# Patient Record
Sex: FEMALE | Race: WHITE | NOT HISPANIC OR LATINO
[De-identification: names, ages, dates, MRNs, and addresses within clinical notes are randomized per-mention and may not be internally consistent; named-entity substitution may affect disease eponyms.]

---

## 2017-01-11 ENCOUNTER — APPOINTMENT (OUTPATIENT)
Dept: PLASTIC SURGERY | Facility: CLINIC | Age: 47
End: 2017-01-11

## 2017-02-07 ENCOUNTER — OUTPATIENT (OUTPATIENT)
Dept: OUTPATIENT SERVICES | Facility: HOSPITAL | Age: 47
LOS: 1 days | Discharge: HOME | End: 2017-02-07

## 2017-02-07 VITALS
HEIGHT: 67 IN | SYSTOLIC BLOOD PRESSURE: 114 MMHG | TEMPERATURE: 98 F | OXYGEN SATURATION: 99 % | WEIGHT: 147.93 LBS | RESPIRATION RATE: 20 BRPM | DIASTOLIC BLOOD PRESSURE: 67 MMHG | HEART RATE: 73 BPM

## 2017-02-07 DIAGNOSIS — N65.0 DEFORMITY OF RECONSTRUCTED BREAST: ICD-10-CM

## 2017-02-07 DIAGNOSIS — Z90.13 ACQUIRED ABSENCE OF BILATERAL BREASTS AND NIPPLES: Chronic | ICD-10-CM

## 2017-02-07 DIAGNOSIS — Z15.01 GENETIC SUSCEPTIBILITY TO MALIGNANT NEOPLASM OF BREAST: ICD-10-CM

## 2017-02-07 DIAGNOSIS — Z87.19 PERSONAL HISTORY OF OTHER DISEASES OF THE DIGESTIVE SYSTEM: Chronic | ICD-10-CM

## 2017-02-07 DIAGNOSIS — N65.1 DISPROPORTION OF RECONSTRUCTED BREAST: ICD-10-CM

## 2017-02-07 DIAGNOSIS — S43.439A SUPERIOR GLENOID LABRUM LESION OF UNSPECIFIED SHOULDER, INITIAL ENCOUNTER: Chronic | ICD-10-CM

## 2017-02-07 DIAGNOSIS — Z90.13 ACQUIRED ABSENCE OF BILATERAL BREASTS AND NIPPLES: ICD-10-CM

## 2017-02-07 LAB
APPEARANCE UR: CLEAR — SIGNIFICANT CHANGE UP
BACTERIA # UR AUTO: (no result)
BASOPHILS # BLD AUTO: 0.1 K/UL — SIGNIFICANT CHANGE UP (ref 0–0.2)
BASOPHILS NFR BLD AUTO: 1.3 % — SIGNIFICANT CHANGE UP (ref 0–2)
BILIRUB UR-MCNC: NEGATIVE — SIGNIFICANT CHANGE UP
BLD GP AB SCN SERPL QL: SIGNIFICANT CHANGE UP
COLOR SPEC: YELLOW — SIGNIFICANT CHANGE UP
DIFF PNL FLD: NEGATIVE — SIGNIFICANT CHANGE UP
EOSINOPHIL # BLD AUTO: 0.1 K/UL — SIGNIFICANT CHANGE UP (ref 0–0.5)
EOSINOPHIL NFR BLD AUTO: 2 % — SIGNIFICANT CHANGE UP (ref 0–6)
EPI CELLS # UR: (no result)
GLUCOSE UR QL: NEGATIVE MG/DL — SIGNIFICANT CHANGE UP
HCT VFR BLD CALC: 37.4 % — SIGNIFICANT CHANGE UP (ref 34.5–45)
HGB BLD-MCNC: 11.7 G/DL — SIGNIFICANT CHANGE UP (ref 11.5–15.5)
KETONES UR-MCNC: NEGATIVE — SIGNIFICANT CHANGE UP
LEUKOCYTE ESTERASE UR-ACNC: NEGATIVE — SIGNIFICANT CHANGE UP
LYMPHOCYTES # BLD AUTO: 2 K/UL — SIGNIFICANT CHANGE UP (ref 1–3.3)
LYMPHOCYTES # BLD AUTO: 26.8 % — SIGNIFICANT CHANGE UP (ref 13–44)
MCHC RBC-ENTMCNC: 26.2 PG — LOW (ref 27–34)
MCHC RBC-ENTMCNC: 31.4 GM/DL — LOW (ref 32–36)
MCV RBC AUTO: 83.5 FL — SIGNIFICANT CHANGE UP (ref 80–100)
MONOCYTES # BLD AUTO: 0.5 K/UL — SIGNIFICANT CHANGE UP (ref 0–0.9)
MONOCYTES NFR BLD AUTO: 7.1 % — SIGNIFICANT CHANGE UP (ref 2–14)
NEUTROPHILS # BLD AUTO: 4.7 K/UL — SIGNIFICANT CHANGE UP (ref 1.8–7.4)
NEUTROPHILS NFR BLD AUTO: 62.8 % — SIGNIFICANT CHANGE UP (ref 43–77)
NITRITE UR-MCNC: NEGATIVE — SIGNIFICANT CHANGE UP
PH UR: 5 — SIGNIFICANT CHANGE UP (ref 4.8–8)
PLATELET # BLD AUTO: 332 K/UL — SIGNIFICANT CHANGE UP (ref 150–400)
PROT UR-MCNC: 15 MG/DL
RBC # BLD: 4.48 M/UL — SIGNIFICANT CHANGE UP (ref 3.8–5.2)
RBC # FLD: 13.2 % — SIGNIFICANT CHANGE UP (ref 10.3–14.5)
RBC CASTS # UR COMP ASSIST: NEGATIVE /HPF — SIGNIFICANT CHANGE UP (ref 0–4)
SP GR SPEC: 1.02 — SIGNIFICANT CHANGE UP (ref 1.01–1.02)
TYPE + AB SCN PNL BLD: SIGNIFICANT CHANGE UP
UROBILINOGEN FLD QL: NEGATIVE MG/DL — SIGNIFICANT CHANGE UP
WBC # BLD: 7.5 K/UL — SIGNIFICANT CHANGE UP (ref 3.8–10.5)
WBC # FLD AUTO: 7.5 K/UL — SIGNIFICANT CHANGE UP (ref 3.8–10.5)
WBC UR QL: SIGNIFICANT CHANGE UP

## 2017-02-07 NOTE — H&P PST ADULT - HISTORY OF PRESENT ILLNESS
47 y/o female with history of breast tumor, family history of breast cancer and positive for CHEK 2 gene. S/P b/l mastectomy with reconstruction on 10/2016. Denies breast pain. Here today for PST for next stage of b/l  breast reconstruction.

## 2017-02-07 NOTE — H&P PST ADULT - NSANTHOSAYNRD_GEN_A_CORE
No. FREDDIE screening performed.  STOP BANG Legend: 0-2 = LOW Risk; 3-4 = INTERMEDIATE Risk; 5-8 = HIGH Risk

## 2017-02-07 NOTE — H&P PST ADULT - PSH
H/O bilateral mastectomy  with reconstruction, breast expanders insertion 10/2016  History of lumpectomy of both breasts  2002, 2005  History of umbilical hernia  2010  SLAP shoulder tear  right shoulder SLAP repair  03/2007

## 2017-02-07 NOTE — H&P PST ADULT - PMH
Breast tumor  2016 Oct  Family history of breast cancer    Genetic carrier status  CHEK 2  Hypoglycemia

## 2017-02-07 NOTE — H&P PST ADULT - FAMILY HISTORY
Mother  Still living? Unknown  Family history of breast cancer in mother, Age at diagnosis: Age Unknown     Aunt  Still living? Unknown  Family history of breast cancer, Age at diagnosis: Age Unknown

## 2017-02-07 NOTE — H&P PST ADULT - ASSESSMENT
45 y/o female S/P b/l mastectomy with reconstruction/b/l breast expanders. Scheduled for Revision of b/l breast reconstruction, removal of expanders, insertion of b/l silicone implants, fat grafting with Dr. Colon.    Plan  1. Stop all NSAIDS, herbal supplements and vitamins for 7 days.  2. NPO at midnight.  3. Use EZ sponges as directed  4. Use mupirocin as directed

## 2017-02-12 ENCOUNTER — RX RENEWAL (OUTPATIENT)
Age: 47
End: 2017-02-12

## 2017-02-14 ENCOUNTER — RESULT REVIEW (OUTPATIENT)
Age: 47
End: 2017-02-14

## 2017-02-14 RX ORDER — FAMOTIDINE 10 MG/ML
20 INJECTION INTRAVENOUS ONCE
Qty: 0 | Refills: 0 | Status: COMPLETED | OUTPATIENT
Start: 2017-02-15 | End: 2017-02-15

## 2017-02-14 RX ORDER — ACETAMINOPHEN 500 MG
650 TABLET ORAL ONCE
Qty: 0 | Refills: 0 | Status: COMPLETED | OUTPATIENT
Start: 2017-02-15 | End: 2017-02-15

## 2017-02-14 RX ORDER — OXYCODONE HYDROCHLORIDE 5 MG/1
10 TABLET ORAL ONCE
Qty: 0 | Refills: 0 | Status: DISCONTINUED | OUTPATIENT
Start: 2017-02-15 | End: 2017-02-15

## 2017-02-15 ENCOUNTER — OUTPATIENT (OUTPATIENT)
Dept: OUTPATIENT SERVICES | Facility: HOSPITAL | Age: 47
LOS: 1 days | Discharge: HOME | End: 2017-02-15
Payer: COMMERCIAL

## 2017-02-15 VITALS
HEIGHT: 69 IN | TEMPERATURE: 99 F | RESPIRATION RATE: 16 BRPM | WEIGHT: 145.28 LBS | OXYGEN SATURATION: 97 % | SYSTOLIC BLOOD PRESSURE: 98 MMHG | HEART RATE: 68 BPM | DIASTOLIC BLOOD PRESSURE: 52 MMHG

## 2017-02-15 VITALS
SYSTOLIC BLOOD PRESSURE: 113 MMHG | HEART RATE: 65 BPM | RESPIRATION RATE: 17 BRPM | OXYGEN SATURATION: 99 % | TEMPERATURE: 98 F | DIASTOLIC BLOOD PRESSURE: 58 MMHG

## 2017-02-15 DIAGNOSIS — S43.439A SUPERIOR GLENOID LABRUM LESION OF UNSPECIFIED SHOULDER, INITIAL ENCOUNTER: Chronic | ICD-10-CM

## 2017-02-15 DIAGNOSIS — Z87.19 PERSONAL HISTORY OF OTHER DISEASES OF THE DIGESTIVE SYSTEM: Chronic | ICD-10-CM

## 2017-02-15 DIAGNOSIS — Z90.13 ACQUIRED ABSENCE OF BILATERAL BREASTS AND NIPPLES: Chronic | ICD-10-CM

## 2017-02-15 LAB — HCG UR QL: NEGATIVE — SIGNIFICANT CHANGE UP

## 2017-02-15 PROCEDURE — 19380 REVJ RECONSTRUCTED BREAST: CPT | Mod: 59,RT

## 2017-02-15 PROCEDURE — 19342 INSJ/RPLCMT BRST IMPLT SEP D: CPT | Mod: LT

## 2017-02-15 PROCEDURE — 88300 SURGICAL PATH GROSS: CPT | Mod: 26

## 2017-02-15 PROCEDURE — 19328 RMVL INTACT BREAST IMPLANT: CPT | Mod: LT

## 2017-02-15 RX ORDER — MULTIVIT-MIN/FERROUS GLUCONATE 9 MG/15 ML
1 LIQUID (ML) ORAL
Qty: 0 | Refills: 0 | COMMUNITY

## 2017-02-15 RX ORDER — MEPERIDINE HYDROCHLORIDE 50 MG/ML
12.5 INJECTION INTRAMUSCULAR; INTRAVENOUS; SUBCUTANEOUS
Qty: 0 | Refills: 0 | Status: DISCONTINUED | OUTPATIENT
Start: 2017-02-15 | End: 2017-02-15

## 2017-02-15 RX ORDER — ONDANSETRON 8 MG/1
4 TABLET, FILM COATED ORAL ONCE
Qty: 0 | Refills: 0 | Status: DISCONTINUED | OUTPATIENT
Start: 2017-02-15 | End: 2017-03-02

## 2017-02-15 RX ORDER — OXYCODONE HYDROCHLORIDE 5 MG/1
5 TABLET ORAL EVERY 4 HOURS
Qty: 0 | Refills: 0 | Status: DISCONTINUED | OUTPATIENT
Start: 2017-02-15 | End: 2017-02-15

## 2017-02-15 RX ORDER — SODIUM CHLORIDE 9 MG/ML
1000 INJECTION, SOLUTION INTRAVENOUS
Qty: 0 | Refills: 0 | Status: DISCONTINUED | OUTPATIENT
Start: 2017-02-15 | End: 2017-02-15

## 2017-02-15 RX ORDER — PROCHLORPERAZINE MALEATE 5 MG
10 TABLET ORAL ONCE
Qty: 0 | Refills: 0 | Status: DISCONTINUED | OUTPATIENT
Start: 2017-02-15 | End: 2017-03-02

## 2017-02-15 RX ORDER — FENTANYL CITRATE 50 UG/ML
50 INJECTION INTRAVENOUS
Qty: 0 | Refills: 0 | Status: DISCONTINUED | OUTPATIENT
Start: 2017-02-15 | End: 2017-02-15

## 2017-02-15 RX ADMIN — Medication 650 MILLIGRAM(S): at 07:49

## 2017-02-15 RX ADMIN — OXYCODONE HYDROCHLORIDE 10 MILLIGRAM(S): 5 TABLET ORAL at 07:49

## 2017-02-15 RX ADMIN — SODIUM CHLORIDE 75 MILLILITER(S): 9 INJECTION, SOLUTION INTRAVENOUS at 11:18

## 2017-02-15 RX ADMIN — FAMOTIDINE 20 MILLIGRAM(S): 10 INJECTION INTRAVENOUS at 07:49

## 2017-02-15 NOTE — PRE-ANESTHESIA EVALUATION ADULT - NSATTENDATTESTRD_GEN_ALL_CORE
The patient has been re-examined and I agree with the above assessment or I updated with my findings.

## 2017-02-15 NOTE — ASU DISCHARGE PLAN (ADULT/PEDIATRIC). - NOTIFY
Fever greater than 101/Pain not relieved by Medications/Swelling that continues/Bleeding that does not stop

## 2017-02-15 NOTE — ASU DISCHARGE PLAN (ADULT/PEDIATRIC). - YOU WERE IN THE HOSPITAL FOR:
removal of tissue expanders and replaced with silicone implants removal of bilateral  tissue expanders and replaced with silicone implants

## 2017-02-15 NOTE — ASU DISCHARGE PLAN (ADULT/PEDIATRIC). - NURSING INSTRUCTIONS
For any problems or concerns,contact your doctor. [facility] patients should call the [facility]. If you cannot reach the doctor or clinic, call [facility] Emergency Department at [phone] or go to your local Emergency Department.  A responsible adult should be with you for the rest of the day and night for your safety and to help you if you needed. Resume your medications as listed on the attached Medication Record.

## 2017-02-16 LAB — SURGICAL PATHOLOGY FINAL REPORT - CH: SIGNIFICANT CHANGE UP

## 2017-02-22 DIAGNOSIS — Z90.13 ACQUIRED ABSENCE OF BILATERAL BREASTS AND NIPPLES: ICD-10-CM

## 2017-02-22 DIAGNOSIS — Z15.01 GENETIC SUSCEPTIBILITY TO MALIGNANT NEOPLASM OF BREAST: ICD-10-CM

## 2017-02-22 DIAGNOSIS — N65.0 DEFORMITY OF RECONSTRUCTED BREAST: ICD-10-CM

## 2017-02-22 DIAGNOSIS — N65.1 DISPROPORTION OF RECONSTRUCTED BREAST: ICD-10-CM

## 2017-02-23 ENCOUNTER — APPOINTMENT (OUTPATIENT)
Dept: PLASTIC SURGERY | Facility: CLINIC | Age: 47
End: 2017-02-23

## 2017-02-27 ENCOUNTER — APPOINTMENT (OUTPATIENT)
Dept: PLASTIC SURGERY | Facility: CLINIC | Age: 47
End: 2017-02-27

## 2017-03-27 ENCOUNTER — APPOINTMENT (OUTPATIENT)
Age: 47
End: 2017-03-27

## 2017-04-19 ENCOUNTER — RESULT REVIEW (OUTPATIENT)
Age: 47
End: 2017-04-19

## 2017-05-30 ENCOUNTER — APPOINTMENT (OUTPATIENT)
Dept: BREAST CENTER | Facility: CLINIC | Age: 47
End: 2017-05-30

## 2017-05-30 VITALS
SYSTOLIC BLOOD PRESSURE: 108 MMHG | WEIGHT: 135 LBS | BODY MASS INDEX: 21.44 KG/M2 | HEIGHT: 66.5 IN | HEART RATE: 74 BPM | DIASTOLIC BLOOD PRESSURE: 76 MMHG

## 2017-05-30 DIAGNOSIS — Z15.09 GENETIC SUSCEPTIBILITY TO MALIGNANT NEOPLASM OF BREAST: ICD-10-CM

## 2017-05-30 DIAGNOSIS — L03.119 CELLULITIS OF UNSPECIFIED PART OF LIMB: ICD-10-CM

## 2017-05-30 DIAGNOSIS — Z09 ENCOUNTER FOR FOLLOW-UP EXAMINATION AFTER COMPLETED TREATMENT FOR CONDITIONS OTHER THAN MALIGNANT NEOPLASM: ICD-10-CM

## 2017-05-30 DIAGNOSIS — Z91.89 OTHER SPECIFIED PERSONAL RISK FACTORS, NOT ELSEWHERE CLASSIFIED: ICD-10-CM

## 2017-05-30 DIAGNOSIS — Z15.89 GENETIC SUSCEPTIBILITY TO MALIGNANT NEOPLASM OF BREAST: ICD-10-CM

## 2017-05-30 DIAGNOSIS — Z15.02 GENETIC SUSCEPTIBILITY TO MALIGNANT NEOPLASM OF BREAST: ICD-10-CM

## 2017-05-30 DIAGNOSIS — Z90.13 ACQUIRED ABSENCE OF BILATERAL BREASTS AND NIPPLES: ICD-10-CM

## 2017-05-30 DIAGNOSIS — Z15.01 GENETIC SUSCEPTIBILITY TO MALIGNANT NEOPLASM OF BREAST: ICD-10-CM

## 2017-05-30 DIAGNOSIS — Z15.03 GENETIC SUSCEPTIBILITY TO MALIGNANT NEOPLASM OF BREAST: ICD-10-CM

## 2017-05-30 RX ORDER — OFLOXACIN 3 MG/ML
0.3 SOLUTION/ DROPS OPHTHALMIC
Qty: 5 | Refills: 0 | Status: DISCONTINUED | COMMUNITY
Start: 2017-02-03

## 2017-05-30 RX ORDER — OXYCODONE AND ACETAMINOPHEN 5; 325 MG/1; MG/1
5-325 TABLET ORAL
Qty: 30 | Refills: 0 | Status: DISCONTINUED | COMMUNITY
Start: 2017-02-10 | End: 2017-05-30

## 2017-05-30 RX ORDER — MUPIROCIN 20 MG/G
2 OINTMENT TOPICAL
Qty: 22 | Refills: 0 | Status: DISCONTINUED | COMMUNITY
Start: 2016-11-30

## 2017-05-30 RX ORDER — CIPROFLOXACIN HYDROCHLORIDE 500 MG/1
500 TABLET, FILM COATED ORAL
Qty: 14 | Refills: 0 | Status: DISCONTINUED | COMMUNITY
Start: 2017-02-13 | End: 2017-05-30

## 2017-05-30 RX ORDER — CIPROFLOXACIN HYDROCHLORIDE 500 MG/1
500 TABLET, FILM COATED ORAL
Qty: 14 | Refills: 0 | Status: DISCONTINUED | COMMUNITY
Start: 2017-02-12 | End: 2017-05-30

## 2018-05-30 ENCOUNTER — APPOINTMENT (OUTPATIENT)
Dept: BREAST CENTER | Facility: CLINIC | Age: 48
End: 2018-05-30

## 2019-04-17 ENCOUNTER — RESULT REVIEW (OUTPATIENT)
Age: 49
End: 2019-04-17

## 2019-06-26 ENCOUNTER — TRANSCRIPTION ENCOUNTER (OUTPATIENT)
Age: 49
End: 2019-06-26

## 2019-07-29 NOTE — ASU PREOP CHECKLIST - ORDERS/MEDICATION ADMINISTRATION RECORD ON CHART
done Manual Repair Warning Statement: We plan on removing the manually selected variable below in favor of our much easier automatic structured text blocks found in the previous tab. We decided to do this to help make the flow better and give you the full power of structured data. Manual selection is never going to be ideal in our platform and I would encourage you to avoid using manual selection from this point on, especially since I will be sunsetting this feature. It is important that you do one of two things with the customized text below. First, you can save all of the text in a word file so you can have it for future reference. Second, transfer the text to the appropriate area in the Library tab. Lastly, if there is a flap or graft type which we do not have you need to let us know right away so I can add it in before the variable is hidden. No need to panic, we plan to give you roughly 6 months to make the change.

## 2019-10-10 ENCOUNTER — APPOINTMENT (OUTPATIENT)
Dept: URBAN - METROPOLITAN AREA CLINIC 220 | Age: 49
Setting detail: DERMATOLOGY
End: 2019-10-13

## 2019-10-10 ENCOUNTER — APPOINTMENT (OUTPATIENT)
Dept: URBAN - METROPOLITAN AREA CLINIC 220 | Age: 49
Setting detail: DERMATOLOGY
End: 2019-10-10

## 2019-10-10 DIAGNOSIS — Z41.9 ENCOUNTER FOR PROCEDURE FOR PURPOSES OTHER THAN REMEDYING HEALTH STATE, UNSPECIFIED: ICD-10-CM

## 2019-10-10 DIAGNOSIS — L98.8 OTHER SPECIFIED DISORDERS OF THE SKIN AND SUBCUTANEOUS TISSUE: ICD-10-CM

## 2019-10-10 PROCEDURE — OTHER XEOMIN: OTHER

## 2019-10-10 PROCEDURE — OTHER MIPS QUALITY: OTHER

## 2019-10-10 PROCEDURE — OTHER OTHER (COSMETIC): OTHER

## 2019-10-10 PROCEDURE — OTHER COUNSELING: OTHER

## 2019-10-10 NOTE — PROCEDURE: MIPS QUALITY
Quality 431: Preventive Care And Screening: Unhealthy Alcohol Use - Screening: Patient screened for unhealthy alcohol use using a single question and scores less than 2 times per year
Quality 226: Preventive Care And Screening: Tobacco Use: Screening And Cessation Intervention: Patient screened for tobacco use and is an ex/non-smoker
Detail Level: Detailed
Quality 110: Preventive Care And Screening: Influenza Immunization: Influenza immunization was not ordered or administered, reason not given
Quality 474: Zoster Vaccination Status: Shingrix Vaccination not Administered or Previously Received, Reason not Otherwise Specified
Quality 130: Documentation Of Current Medications In The Medical Record: Current Medications Documented

## 2019-10-10 NOTE — PROCEDURE: MIPS QUALITY
Quality 131: Pain Assessment And Follow-Up: Pain assessment using a standardized tool is documented as negative, no follow-up plan required
Detail Level: Detailed
Quality 130: Documentation Of Current Medications In The Medical Record: Current Medications Documented
Quality 431: Preventive Care And Screening: Unhealthy Alcohol Use - Screening: Patient screened for unhealthy alcohol use using a single question and scores less than 2 times per year
Quality 226: Preventive Care And Screening: Tobacco Use: Screening And Cessation Intervention: Patient screened for tobacco use and is an ex/non-smoker
Quality 474: Zoster Vaccination Status: Shingrix Vaccination not Administered or Previously Received, Reason not Otherwise Specified
Quality 110: Preventive Care And Screening: Influenza Immunization: Influenza immunization was not ordered or administered, reason not given

## 2019-10-10 NOTE — PROCEDURE: OTHER (COSMETIC)
Detail Level: Zone
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  Rec IPL/1540 combo x 3 txs to chest.  Rec spacing the treatments about 4 weeks apart.  The procedure was reviewed in detail, r&b and post treatment expectations.  Informational handout given with a cq.  Discussed that improvement could be made but it would not completely resolve.\\n\\n2.  After txs, if improvement in texture is desired could consider microneedling and or chemical peels.  Will revisit if this is preferred.\\n\\n\\nNOTE:\\n\\Adam indications, treatment expectations (including management of any possible irritations), protocols, risks and benefits, pre/post care are reviewed.  Patient understands that multiple treatments may be necessary for optimum results and that on going maintenance with at-home products and additional office visits or treatments may be needed to enhance and extend the desired results.\\n\\Adam questions were addressed.\\n\\nRTC-tbd

## 2019-10-10 NOTE — HPI: OTHER
Condition:: Cosmetic
Please Describe Your Condition:: comes in for a chief complaint of Cosmetic. Pt is interested I; tx options for browns and red on the v chest.   Medications, allergies and medical hx were reviewed..

## 2020-01-09 ENCOUNTER — APPOINTMENT (OUTPATIENT)
Dept: URBAN - METROPOLITAN AREA CLINIC 220 | Age: 50
Setting detail: DERMATOLOGY
End: 2020-01-10

## 2020-01-09 DIAGNOSIS — L98.8 OTHER SPECIFIED DISORDERS OF THE SKIN AND SUBCUTANEOUS TISSUE: ICD-10-CM

## 2020-01-09 PROCEDURE — OTHER MIPS QUALITY: OTHER

## 2020-01-09 PROCEDURE — OTHER XEOMIN: OTHER

## 2020-01-09 NOTE — PROCEDURE: MIPS QUALITY
Quality 131: Pain Assessment And Follow-Up: Pain assessment using a standardized tool is documented as negative, no follow-up plan required
Quality 226: Preventive Care And Screening: Tobacco Use: Screening And Cessation Intervention: Patient screened for tobacco use and is an ex/non-smoker
Detail Level: Detailed
Quality 431: Preventive Care And Screening: Unhealthy Alcohol Use - Screening: Patient screened for unhealthy alcohol use using a single question and scores less than 2 times per year
Quality 474: Zoster Vaccination Status: Shingrix Vaccination not Administered or Previously Received, Reason not Otherwise Specified
Quality 130: Documentation Of Current Medications In The Medical Record: Current Medications Documented
Quality 110: Preventive Care And Screening: Influenza Immunization: Influenza immunization was not ordered or administered, reason not given

## 2020-01-09 NOTE — HPI: COSMETIC (XEOMIN)
Have You Had Xeomin Before?: has had xeomin
Additional History: (Pain 0/10)
When Was Your Last Xeomin Treatment?: 10/10/2019

## 2020-01-22 ENCOUNTER — APPOINTMENT (OUTPATIENT)
Dept: URBAN - METROPOLITAN AREA CLINIC 211 | Age: 50
Setting detail: DERMATOLOGY
End: 2020-01-22

## 2020-01-22 DIAGNOSIS — Z41.9 ENCOUNTER FOR PROCEDURE FOR PURPOSES OTHER THAN REMEDYING HEALTH STATE, UNSPECIFIED: ICD-10-CM

## 2020-01-22 PROCEDURE — OTHER MIPS QUALITY: OTHER

## 2020-01-22 PROCEDURE — OTHER OTHER (COSMETIC): OTHER

## 2020-01-22 NOTE — HPI: OTHER
Condition:: Cosmetic
Please Describe Your Condition:: Pt is interested in tx options for her crepe skin on her neck and the jowling.  Pt states that she has previously had microneedling with prp but it has been years ago.  Pt is not interested in filler or anything invasive.  She states she is happy with the way she is aging.  Medications, allergies and medical hx were reviewed.\\n\\nPt denies a hx of herpes [id number] or herpes zoster on her face neck and chest.

## 2020-01-22 NOTE — PROCEDURE: MIPS QUALITY
Quality 226: Preventive Care And Screening: Tobacco Use: Screening And Cessation Intervention: Patient screened for tobacco use and is an ex/non-smoker
Quality 474: Zoster Vaccination Status: Shingrix Vaccination not Administered or Previously Received, Reason not Otherwise Specified
Quality 431: Preventive Care And Screening: Unhealthy Alcohol Use - Screening: Patient screened for unhealthy alcohol use using a single question and scores less than 2 times per year
Quality 110: Preventive Care And Screening: Influenza Immunization: Influenza immunization was not ordered or administered, reason not given
Quality 130: Documentation Of Current Medications In The Medical Record: Current Medications Documented
Detail Level: Detailed

## 2020-01-22 NOTE — PROCEDURE: OTHER (COSMETIC)
Other (Free Text): Cosmetic Consultation\\nPE:skin laxity, crepiness and jowling \\n\\nPLAN:\\n\\n1.  Rec Infini x 3 txs.  As I was reviewed the procedure, r&b and post tx expectations pt verbalized that she would not be interested in this procedure.  She is looking for something that is less intense.\\n\\n2.  Rec microneedling x 5 txs for her neck and jowl area.  Spent additional time reviewed tx expectations and that it more effect the texture, pigmentation, crepiness but would not help with skin tightening.  Rationale was provided.  CQ was provided.\\n\\nNOTE:\\n\\Adam indications, treatment expectations (including management of any possible irritations), protocols, risks and benefits, pre/post care are reviewed.  Patient understands that multiple treatments may be necessary for optimum results and that on going maintenance with at-home products and additional office visits or treatments may be needed to enhance and extend the desired results.\\n\\Adam questions were addressed.\\n\\nRTC-tbd
Detail Level: Zone

## 2020-02-12 ENCOUNTER — APPOINTMENT (OUTPATIENT)
Dept: URBAN - METROPOLITAN AREA CLINIC 220 | Age: 50
Setting detail: DERMATOLOGY
End: 2020-02-12

## 2020-02-12 DIAGNOSIS — Z41.9 ENCOUNTER FOR PROCEDURE FOR PURPOSES OTHER THAN REMEDYING HEALTH STATE, UNSPECIFIED: ICD-10-CM

## 2020-02-12 PROCEDURE — OTHER OTHER (COSMETIC): OTHER

## 2020-02-12 PROCEDURE — OTHER MIPS QUALITY: OTHER

## 2020-02-12 NOTE — PROCEDURE: OTHER (COSMETIC)
Other (Free Text): Skin Pen Rejuvenation (Microneedling)\\n\\nPE:  improvement of fine rhytids, skin texture, & scarring\\n\\nPrior to treatment, all but not limited to treatment indications and expectations(including management of any possible irritations) protocols, risks and benefits were reviewed in detail, including post treatment expectations. All questions were answered prior to administering the treatment.\\n\\nTreatment #\\n\\nSite(s):\\n\\nForehead & nasal -   mm\\nGlabella -     mm\\nInferior Brow -    mm\\nlower eyelids -      mm\\nPerioral & Chin -     mm\\nCheeks -     mm\\nUpper and Lower Lips -     mm\\n\\nPin point bleeding was achieved as per protocol.\\n\\nNote: Written post treatment care instructions are provided.  Photo of instructions is taken in iPad.\\n\\Adam indications, treatment expectations (including management of any possible irritations), protocols, risks and benefits, pre/post care are reviewed.   Additional information can be found on consent documentation. Patient understands that multiple treatments may be necessary for optimum results and that ongoing maintenance with at-home products and additional office visits or treatments may be needed to enhance and extend the desired results.\\n\\nStandard protocol was done.  Following treatment, the expected mild erythema and edema was observed.  Patient tolerated the procedure well without immediate complication.  Post care was reviewed and a follow up appointment was recommended.\\n\\nAnswered all questions.\\n\\nRTC:
Detail Level: Zone

## 2020-02-12 NOTE — HPI: OTHER
Condition:: Cosmetic
Please Describe Your Condition:: Patient presents today for Microneedling treatment #1 for Jowls and neck. BLT applied prior to treatment. \\n(Pain 0/10)

## 2020-02-12 NOTE — PROCEDURE: MIPS QUALITY
Quality 110: Preventive Care And Screening: Influenza Immunization: Influenza immunization was not ordered or administered, reason not given
Quality 226: Preventive Care And Screening: Tobacco Use: Screening And Cessation Intervention: Patient screened for tobacco use and is an ex/non-smoker
Quality 130: Documentation Of Current Medications In The Medical Record: Current Medications Documented
Detail Level: Detailed
Quality 431: Preventive Care And Screening: Unhealthy Alcohol Use - Screening: Patient screened for unhealthy alcohol use using a single question and scores less than 2 times per year
Quality 474: Zoster Vaccination Status: Shingrix Vaccination not Administered or Previously Received, Reason not Otherwise Specified

## 2020-03-26 ENCOUNTER — APPOINTMENT (OUTPATIENT)
Dept: URBAN - METROPOLITAN AREA CLINIC 211 | Age: 50
Setting detail: DERMATOLOGY
End: 2020-03-26

## 2020-03-26 DIAGNOSIS — Z41.9 ENCOUNTER FOR PROCEDURE FOR PURPOSES OTHER THAN REMEDYING HEALTH STATE, UNSPECIFIED: ICD-10-CM

## 2020-03-26 PROCEDURE — OTHER MIPS QUALITY: OTHER

## 2020-03-26 PROCEDURE — OTHER OTHER (COSMETIC): OTHER

## 2020-03-26 NOTE — PROCEDURE: MIPS QUALITY
Quality 131: Pain Assessment And Follow-Up: Pain assessment using a standardized tool is documented as negative, no follow-up plan required
Quality 226: Preventive Care And Screening: Tobacco Use: Screening And Cessation Intervention: Patient screened for tobacco use and is an ex/non-smoker
Quality 130: Documentation Of Current Medications In The Medical Record: Current Medications Documented
Detail Level: Detailed
Quality 110: Preventive Care And Screening: Influenza Immunization: Influenza immunization was not ordered or administered, reason not given
Quality 431: Preventive Care And Screening: Unhealthy Alcohol Use - Screening: Patient screened for unhealthy alcohol use using a single question and scores less than 2 times per year

## 2020-03-26 NOTE — HPI: OTHER
Condition:: Cosmetic
Please Describe Your Condition:: comes in for Microneedling treatment #2/5. Location: Nares extending to anterior neck.  Pt confirms no comp following tx#1/5 done by Xavier. See chart hx. Patients allergies and medical history reviewed. \\n\\nPain 0-10

## 2020-06-02 ENCOUNTER — APPOINTMENT (OUTPATIENT)
Dept: URBAN - METROPOLITAN AREA CLINIC 211 | Age: 50
Setting detail: DERMATOLOGY
End: 2020-06-02

## 2020-06-02 DIAGNOSIS — L98.8 OTHER SPECIFIED DISORDERS OF THE SKIN AND SUBCUTANEOUS TISSUE: ICD-10-CM

## 2020-06-02 PROCEDURE — OTHER XEOMIN: OTHER

## 2020-06-02 PROCEDURE — OTHER COUNSELING: OTHER

## 2020-06-02 PROCEDURE — OTHER MIPS QUALITY: OTHER

## 2020-06-02 ASSESSMENT — LOCATION SIMPLE DESCRIPTION DERM: LOCATION SIMPLE: GLABELLA

## 2020-06-02 ASSESSMENT — LOCATION ZONE DERM: LOCATION ZONE: FACE

## 2020-06-02 ASSESSMENT — LOCATION DETAILED DESCRIPTION DERM: LOCATION DETAILED: GLABELLA

## 2020-06-02 NOTE — HPI: COSMETIC (XEOMIN)
Have You Had Xeomin Before?: has had xeomin
Additional History: Pain 0-10
When Was Your Last Xeomin Treatment?: 01/2020

## 2020-06-02 NOTE — PROCEDURE: MIPS QUALITY
Quality 131: Pain Assessment And Follow-Up: Pain assessment using a standardized tool is documented as negative, no follow-up plan required
Quality 431: Preventive Care And Screening: Unhealthy Alcohol Use - Screening: Patient screened for unhealthy alcohol use using a single question and scores less than 2 times per year
Quality 110: Preventive Care And Screening: Influenza Immunization: Influenza immunization was not ordered or administered, reason not given
Quality 226: Preventive Care And Screening: Tobacco Use: Screening And Cessation Intervention: Patient screened for tobacco use and is an ex/non-smoker
Quality 130: Documentation Of Current Medications In The Medical Record: Current Medications Documented
Quality 400a: One-Time Screening For Hepatitis C Virus (Hcv) For All Patients: Patient received one-time screening for HCV infection
Detail Level: Detailed

## 2020-06-08 ENCOUNTER — APPOINTMENT (OUTPATIENT)
Dept: URBAN - METROPOLITAN AREA CLINIC 211 | Age: 50
Setting detail: DERMATOLOGY
End: 2020-06-11

## 2020-06-08 DIAGNOSIS — Z41.9 ENCOUNTER FOR PROCEDURE FOR PURPOSES OTHER THAN REMEDYING HEALTH STATE, UNSPECIFIED: ICD-10-CM

## 2020-06-08 PROCEDURE — OTHER OTHER (COSMETIC): OTHER

## 2020-06-08 PROCEDURE — OTHER MIPS QUALITY: OTHER

## 2020-06-08 NOTE — HPI: OTHER
Condition:: Cosmetic
Please Describe Your Condition:: Pt presents for microneedling from nares to anterior neck, treatment #3/5.  Pt states after her last treatment she had redness and flaking for about 1 week.  Pt denies any specific concerns today.  Pt states she can see an improvement in her skin since her first treatment, especially in her neck.  Pt states she is using [employer] home products and has a hx of sensitive skin and breakouts from products.  Medications, allergies and medical history were reviewed with patient

## 2020-06-08 NOTE — PROCEDURE: OTHER (COSMETIC)
Other (Free Text): Skin Pen Rejuvenation (Microneedling)\\n\\nPE:  improvement of fine rhytids, skin texture, & scarring\\n\\nPrior to treatment, all but not limited to treatment indications and expectations(including management of any possible irritations) protocols, risks and benefits were reviewed in detail, including post treatment expectations. All questions were answered prior to administering the treatment.\\n\\nTreatment # 3/5\\n\\nSite(s): nares to anterior neck\\n\\nPerioral & Chin -   2.0  mm\\nCheeks -  2.0   mm\\nUpper and Lower Lips -  2.0   mm\\nAnterior neck - 2.0 mm\\nModerate Density\\n\\n\\nPin point bleeding was achieved as per protocol.\\n\\nNote: Written post treatment care instructions are provided.  Photo of instructions is taken in iPad.\\n\\n1. Recommended next treatment for a chemical peel, specifically Silver Lift.  Informational handout was given and a verbal CQ given of $135.00.  Reviewed R&B and post treatment expectations.  Pt verbalized understanding. \\n\\n\\Adam indications, treatment expectations (including management of any possible irritations), protocols, risks and benefits, pre/post care are reviewed.   Additional information can be found on consent documentation. Patient understands that multiple treatments may be necessary for optimum results and that ongoing maintenance with at-home products and additional office visits or treatments may be needed to enhance and extend the desired results.\\n\\nStandard protocol was done.  Following treatment, the expected mild erythema and edema was observed.  Patient tolerated the procedure well without immediate complication.  Post care was reviewed and a follow up appointment was recommended.\\n\\nAnswered all questions.\\n\\nRTC: 4-6 week chemical peel Silver Lift
Detail Level: Zone

## 2020-06-08 NOTE — PROCEDURE: MIPS QUALITY
Quality 431: Preventive Care And Screening: Unhealthy Alcohol Use - Screening: Patient screened for unhealthy alcohol use using a single question and scores less than 2 times per year
Quality 110: Preventive Care And Screening: Influenza Immunization: Influenza immunization was not ordered or administered, reason not given
Quality 131: Pain Assessment And Follow-Up: Pain assessment using a standardized tool is documented as negative, no follow-up plan required
Quality 226: Preventive Care And Screening: Tobacco Use: Screening And Cessation Intervention: Patient screened for tobacco use and is an ex/non-smoker
Quality 130: Documentation Of Current Medications In The Medical Record: Current Medications Documented
Detail Level: Detailed

## 2020-07-17 ENCOUNTER — APPOINTMENT (OUTPATIENT)
Dept: URBAN - METROPOLITAN AREA CLINIC 211 | Age: 50
Setting detail: DERMATOLOGY
End: 2020-07-21

## 2020-07-17 DIAGNOSIS — Z41.9 ENCOUNTER FOR PROCEDURE FOR PURPOSES OTHER THAN REMEDYING HEALTH STATE, UNSPECIFIED: ICD-10-CM

## 2020-07-17 PROCEDURE — OTHER MIPS QUALITY: OTHER

## 2020-07-17 PROCEDURE — OTHER OTHER (COSMETIC): OTHER

## 2020-07-17 NOTE — PROCEDURE: MIPS QUALITY
Quality 130: Documentation Of Current Medications In The Medical Record: Current Medications Documented
Quality 226: Preventive Care And Screening: Tobacco Use: Screening And Cessation Intervention: Patient screened for tobacco use and is an ex/non-smoker
Quality 431: Preventive Care And Screening: Unhealthy Alcohol Use - Screening: Patient screened for unhealthy alcohol use using a single question and scores less than 2 times per year
Quality 110: Preventive Care And Screening: Influenza Immunization: Influenza immunization was not ordered or administered, reason not given
Quality 131: Pain Assessment And Follow-Up: Pain assessment using a standardized tool is documented as negative, no follow-up plan required
Detail Level: Detailed

## 2020-07-17 NOTE — HPI: OTHER
Condition:: Cosmetic
Please Describe Your Condition:: Pt presents for chemical peel to full face.  Pt states that she is seeing a significant improvement in her chin area from the microneedling and she is very happy.  She is hoping to get a more youthful look today.  Medications, allergies and medical history were reviewed with patient

## 2020-07-17 NOTE — PROCEDURE: OTHER (COSMETIC)
Other (Free Text): Plan: PCA Skin Chemical Peel\\nPE: \\n\\nPrior to the procedure, written consent was obtained. R&B were reviewed including but not limited to treatment expectations, redness, peeling, blistering, pigmentary change, scarring, infection, pain, social downtime and post care. \\n\\nTreatment #: 1/1\\nPCA Name:\\nRiva’s Collection Name: \\nTreatment Site: full face\\n\\nPrep/Cleanse: PCA oily\\n\\nCleanse: N/A\\n\\nPrep/Degrease: Acetone \\n\\nPre Treat: N/A\\n\\nTreat: Sensi x’s 2 layer, Ultra Peel I x’s 2 layers, Ultra Peel Forte x’s 2 layers - sponged on technique \\n\\nCorrect: C-Quench, Intensive Brightening Treatment .5% Retinol\\n\\nTreat #2: N/A\\n\\nBoost: Booster\\n\\nCalming Balm: N/A\\n\\nHydrate & Protect: Weightless SPF\\n\\nPost Peel Frost was 0.\\nPost Peel Erythema was mild.\\n\\nNotes:\\n\\n1. UV protection and post care instructions are reviewed in detail along with written information. Patient is advised to use Vaseline to affected areas as needed.\\n\\n2. Patient is advised to defer washing face until the morning. Pt is advised to limit strenuous exercise/activity for a minimum of 48 hrs. Rationale provided including risk for blistering or other complication/s.\\n\\n\\n\\nPatient tolerated the treatment well with no immediate complication. Therapy was applied per protocol. All indications, treatment expectations, including management of irritation and tenderness, protocols, risks and benefits, pre/post care was reviewed. Details of these can be found on the informed consent documentation. Patient understands that multiple treatments may be necessary for optimum results, and that ongoing maintenance with skin care products and additional office visits or treatments may be needed to enhance and extend desired results. \\n\\nRTC: 4-6 weeks
Detail Level: Zone

## 2020-08-19 ENCOUNTER — APPOINTMENT (OUTPATIENT)
Dept: URBAN - METROPOLITAN AREA CLINIC 211 | Age: 50
Setting detail: DERMATOLOGY
End: 2020-08-19

## 2020-08-19 DIAGNOSIS — Z41.9 ENCOUNTER FOR PROCEDURE FOR PURPOSES OTHER THAN REMEDYING HEALTH STATE, UNSPECIFIED: ICD-10-CM

## 2020-08-19 PROCEDURE — OTHER OTHER (COSMETIC): OTHER

## 2020-08-19 PROCEDURE — OTHER MIPS QUALITY: OTHER

## 2020-08-19 NOTE — PROCEDURE: OTHER (COSMETIC)
Detail Level: Zone
Other (Free Text): Skin Pen Rejuvenation (Microneedling)\\n\\nPE:  improvement of fine rhytids, skin texture, & scarring\\n\\nPrior to treatment, all but not limited to treatment indications and expectations(including management of any possible irritations) protocols, risks and benefits were reviewed in detail, including post treatment expectations. All questions were answered prior to administering the treatment.\\n\\nTreatment #4/5\\n\\nSite(s):nares and neck\\n\\nPerioral & Chin -  2.0   mm, 2.5 mm to select SL/SK Left Medial Malar - stamping technique was used with moderate density\\nNeck -2.0 mm\\n\\nPin point bleeding was achieved as per protocol.\\n\\nNote: Written post treatment care instructions are provided.  Photo of instructions is taken in iPad.\\n\\n1.  Samples of Lifeline Aqueous Tx and Elastin Boost and instructed to apply q 2-3 hours x 24 hours then BID\\n\\n2.  Pt c/o of photo damage to her chest and skin laxity on her chest.  Pt currently has an active tan, sls and sks.  Rec IPL in the fall/winter when she does not have a tan.  Rec a series of SkinPen with Chemical peels.  The procedures were reviewed in detail, r&b and post treatment expectations.  CQ was given.\\n\\Adam indications, treatment expectations (including management of any possible irritations), protocols, risks and benefits, pre/post care are reviewed.   Additional information can be found on consent documentation. Patient understands that multiple treatments may be necessary for optimum results and that ongoing maintenance with at-home products and additional office visits or treatments may be needed to enhance and extend the desired results.\\n\\nStandard protocol was done.  Following treatment, the expected mild erythema and edema was observed.  Patient tolerated the procedure well without immediate complication.  Post care was reviewed and a follow up appointment was recommended.\\n\\nAnswered all questions.\\n\\nRTC:6 weeks for tax #5/5

## 2020-08-19 NOTE — HPI: OTHER
Condition:: Cosmetic
Please Describe Your Condition:: is being seen for a chief complaint of Cosmetic. Pt presents for microneedling to nares and neck treatment # 4/5.  Pt states that she had a wonderful response for the peel that she had at the last visit.  Pt is very pleased with improvement. She wishes to discuss treatment rec for photodamage on her chest. Pt had a lot of peeling.  Medications, allergies and medical hx were reviewed.

## 2020-08-19 NOTE — PROCEDURE: MIPS QUALITY
Quality 110: Preventive Care And Screening: Influenza Immunization: Influenza immunization was not ordered or administered, reason not given
Detail Level: Detailed
Quality 131: Pain Assessment And Follow-Up: Pain assessment using a standardized tool is documented as negative, no follow-up plan required
Quality 226: Preventive Care And Screening: Tobacco Use: Screening And Cessation Intervention: Patient screened for tobacco use and is an ex/non-smoker
Quality 431: Preventive Care And Screening: Unhealthy Alcohol Use - Screening: Patient screened for unhealthy alcohol use using a single question and scores less than 2 times per year
Quality 130: Documentation Of Current Medications In The Medical Record: Current Medications Documented

## 2020-09-16 ENCOUNTER — APPOINTMENT (OUTPATIENT)
Dept: URBAN - METROPOLITAN AREA CLINIC 211 | Age: 50
Setting detail: DERMATOLOGY
End: 2020-09-17

## 2020-09-16 DIAGNOSIS — L98.8 OTHER SPECIFIED DISORDERS OF THE SKIN AND SUBCUTANEOUS TISSUE: ICD-10-CM

## 2020-09-16 PROCEDURE — OTHER XEOMIN: OTHER

## 2020-09-16 PROCEDURE — OTHER COUNSELING: OTHER

## 2020-09-16 PROCEDURE — OTHER MIPS QUALITY: OTHER

## 2020-09-16 ASSESSMENT — LOCATION ZONE DERM: LOCATION ZONE: FACE

## 2020-09-16 ASSESSMENT — LOCATION DETAILED DESCRIPTION DERM: LOCATION DETAILED: GLABELLA

## 2020-09-16 ASSESSMENT — LOCATION SIMPLE DESCRIPTION DERM: LOCATION SIMPLE: GLABELLA

## 2020-09-16 NOTE — HPI: COSMETIC (BOTOX)
Have You Had Botox Before?: has had botox
Additional History: Patient states pain level of 0/10\\n
When Was Your Last Botox Treatment?: 06/2020

## 2020-09-16 NOTE — PROCEDURE: MIPS QUALITY
Quality 431: Preventive Care And Screening: Unhealthy Alcohol Use - Screening: Patient screened for unhealthy alcohol use using a single question and scores less than 2 times per year
Detail Level: Detailed
Quality 130: Documentation Of Current Medications In The Medical Record: Current Medications Documented
Quality 110: Preventive Care And Screening: Influenza Immunization: Influenza immunization was not ordered or administered, reason not given
Quality 131: Pain Assessment And Follow-Up: Pain assessment using a standardized tool is documented as negative, no follow-up plan required
Quality 226: Preventive Care And Screening: Tobacco Use: Screening And Cessation Intervention: Patient screened for tobacco use and is an ex/non-smoker

## 2020-10-07 ENCOUNTER — APPOINTMENT (OUTPATIENT)
Dept: URBAN - METROPOLITAN AREA CLINIC 211 | Age: 50
Setting detail: DERMATOLOGY
End: 2020-10-08

## 2020-10-07 DIAGNOSIS — Z41.9 ENCOUNTER FOR PROCEDURE FOR PURPOSES OTHER THAN REMEDYING HEALTH STATE, UNSPECIFIED: ICD-10-CM

## 2020-10-07 PROCEDURE — OTHER OTHER (COSMETIC): OTHER

## 2020-10-07 PROCEDURE — OTHER MIPS QUALITY: OTHER

## 2020-10-07 NOTE — HPI: OTHER
Condition:: Cosmetic
Please Describe Your Condition:: is being seen for a chief complaint of Cosmetic. Pt presents for microneedling from the nares down and including the neck.  This is treatment # 5/5.  Pt states she did well after the last treatment.  She is pleased with progress and wishes to discuss next step for rejuvenation of the chest and neck. Medications, allergies and medical hx were reviewed.

## 2020-10-07 NOTE — PROCEDURE: MIPS QUALITY
Quality 431: Preventive Care And Screening: Unhealthy Alcohol Use - Screening: Patient screened for unhealthy alcohol use using a single question and scores less than 2 times per year
Quality 131: Pain Assessment And Follow-Up: Pain assessment using a standardized tool is documented as negative, no follow-up plan required
Quality 110: Preventive Care And Screening: Influenza Immunization: Influenza immunization was not ordered or administered, reason not given
Quality 130: Documentation Of Current Medications In The Medical Record: Current Medications Documented
Detail Level: Detailed
Quality 226: Preventive Care And Screening: Tobacco Use: Screening And Cessation Intervention: Patient screened for tobacco use and is an ex/non-smoker

## 2020-10-07 NOTE — PROCEDURE: OTHER (COSMETIC)
Detail Level: Zone
Other (Free Text): Skin Pen Rejuvenation (Microneedling)\\n\\nPE:  improvement of fine rhytids, skin texture, & scarring\\n\\nPrior to treatment, all but not limited to treatment indications and expectations(including management of any possible irritations) protocols, risks and benefits were reviewed in detail, including post treatment expectations. All questions were answered prior to administering the treatment.\\n\\nTreatment #5/5\\n\\nSite(s):nares down including the neck\\n\\nPerioral -2.0 mm \\nChin -    2.5 - 2.0 mm\\nCheeks -  2.5 - 2.0   mm\\nMaxilla -  2.0   mm\\nAnterior Neck - 2.5 mm\\n\\nApplied with moderate density\\n\\nPin point bleeding was achieved as per protocol.\\n\\nNote: Written post treatment care instructions are provided.  Photo of instructions is taken in iPad.\\n\\n1.  Gave complimentary Advanced Molecular Serum to apply QD, Recovery Night Complex QHS to use in lieu of jeer current night moisturizer\\n\\n2.,  Rec pt return in November for a complimentary Skin Pen with TCA then follow three week minimum for IPL to full chest and neck to address the browns, then a series of IPL/1540 possible or skin pen layered with chemical peels to neck and chest.  Treatment will be determined at a later date.\\n\\Adam indications, treatment expectations (including management of any possible irritations), protocols, risks and benefits, pre/post care are reviewed.   Additional information can be found on consent documentation. Patient understands that multiple treatments may be necessary for optimum results and that ongoing maintenance with at-home products and additional office visits or treatments may be needed to enhance and extend the desired results.\\n\\nStandard protocol was done.  Following treatment, the expected mild erythema and edema was observed.  Patient tolerated the procedure well without immediate complication.  Post care was reviewed and a follow up appointment was recommended.\\n\\nAnswered all questions.\\n\\nRTC:4 week Complimentary Skin Pen with TCA

## 2020-11-18 ENCOUNTER — APPOINTMENT (OUTPATIENT)
Dept: URBAN - METROPOLITAN AREA CLINIC 211 | Age: 50
Setting detail: DERMATOLOGY
End: 2020-11-24

## 2020-11-18 DIAGNOSIS — Z41.9 ENCOUNTER FOR PROCEDURE FOR PURPOSES OTHER THAN REMEDYING HEALTH STATE, UNSPECIFIED: ICD-10-CM

## 2020-11-18 PROCEDURE — OTHER OTHER (COSMETIC): OTHER

## 2020-11-18 PROCEDURE — OTHER MIPS QUALITY: OTHER

## 2020-11-18 NOTE — PROCEDURE: MIPS QUALITY
Quality 110: Preventive Care And Screening: Influenza Immunization: Influenza immunization was not ordered or administered, reason not given
Quality 130: Documentation Of Current Medications In The Medical Record: Current Medications Documented
Quality 131: Pain Assessment And Follow-Up: Pain assessment using a standardized tool is documented as negative, no follow-up plan required
Detail Level: Detailed
Quality 226: Preventive Care And Screening: Tobacco Use: Screening And Cessation Intervention: Patient screened for tobacco use and is an ex/non-smoker
Quality 431: Preventive Care And Screening: Unhealthy Alcohol Use - Screening: Patient screened for unhealthy alcohol use using a single question and scores less than 2 times per year

## 2020-11-18 NOTE — PROCEDURE: OTHER (COSMETIC)
Detail Level: Zone
Other (Free Text): Skin Pen Rejuvenation (Microneedling)\\n\\nPE:  improvement of fine rhytids, skin texture, & scarring\\n\\nPrior to treatment, all but not limited to treatment indications and expectations(including management of any possible irritations) protocols, risks and benefits were reviewed in detail, including post treatment expectations. All questions were answered prior to administering the treatment.\\n\\nTreatment # 1/1 complimentary\\n\\nSite(s):lower 2/3 face and neck\\n\\nPerioral & Chin -  2.0 - 1.5   mm\\nCheeks -   2.0 - 1.5  mm\\nUpper and Lower Lips - 2.0 - 1.5    mm\\nNeck 2.5 - 2.0 mm\\n\\nPin point bleeding was achieved as per protocol.\\n\\nImmediately following the microneedling, TCA 10 % was applied to the lower 2/3 of face and neck per protocol.  Pt had a moderate Level I Frost.\\n\\nNote: Written post treatment care instructions are provided.  Photo of instructions is taken in iPad.\\n\\Adam indications, treatment expectations (including management of any possible irritations), protocols, risks and benefits, pre/post care are reviewed.   Additional information can be found on consent documentation. Patient understands that multiple treatments may be necessary for optimum results and that ongoing maintenance with at-home products and additional office visits or treatments may be needed to enhance and extend the desired results.\\n\\nStandard protocol was done.  Following treatment, the expected mild erythema and edema was observed.  Patient tolerated the procedure well without immediate complication.  Post care was reviewed and a follow up appointment was recommended.\\n\\nAnswered all questions.\\n\\nRTC:4-6 week follow up

## 2020-11-18 NOTE — HPI: OTHER
Condition:: Cosmetic
Please Describe Your Condition:: Pt presents for a complimentary treatment of microneedling layered with TCA 10% from the nares to the neck.  Medications, allergies and medical hx were reviewed.

## 2020-12-10 ENCOUNTER — APPOINTMENT (OUTPATIENT)
Dept: URBAN - METROPOLITAN AREA CLINIC 211 | Age: 50
Setting detail: DERMATOLOGY
End: 2020-12-10

## 2020-12-10 DIAGNOSIS — Z41.9 ENCOUNTER FOR PROCEDURE FOR PURPOSES OTHER THAN REMEDYING HEALTH STATE, UNSPECIFIED: ICD-10-CM

## 2020-12-10 PROCEDURE — OTHER MIPS QUALITY: OTHER

## 2020-12-10 PROCEDURE — OTHER OTHER (COSMETIC): OTHER

## 2020-12-10 NOTE — PROCEDURE: OTHER (COSMETIC)
Detail Level: Zone
Other (Free Text): IPL utilizing Max Ys\\n\\nPE:sls on the chest\\n\\nIndication: improvement of pigmentation and reduction of vascular lesions\\n\\nPrior to treatment, all but not limited to treatment indications and expectations(including management of any possible irritations) protocols, risks and benefits were reviewed in detail, including post treatment expectations. All questions were answered prior to administering the treatment.\\n\\nTreatment #1\\n\\nSite(s):full chest\\n\\nSettings:\\n\\nMelanin Index=13\\n@ 15 ms value = 32  j - 46 j\\n\\nApplied  15 ms @ 38  j x 1 pass\\n\\nPre, cecil and post cooling was applied utilizing the  and/or the Hayden Chiller. A moisturizing sunblock was applied post treatment. Patient tolerated the procedure well without immediate complication. \\n\\nNote:\\n\\n1.  Pt inquired about the skin laxity in the jowl and neck area.  Gave pt contact information for Dr. Carranza and Dr. Siu.\\n\\n2.  Rec pt introduce retin-a and /or a good neck cream.  Discussed Revision Nectifirm or Neocutis Neck and decolette. Samples were given.  The products were reviewed and rationale was provided.\\n\\n3.  Would rec IPL to select lesion on left cheek if microneedling is not getting lighter.\\n\\n4.  Reviewed with pt that the chest has some sks and if they get to light an additional treatment of IPL would be beneficial.  Reviewed expectations, that microneedling on the chest will help with texture.  Pt will rtc for microneedling of the chest.\\n\\nPatient understands that multiple treatments may be necessary for optimum results and that on going maintenance with at-home products and additional office visits or treatments may be needed to enhance and extend the desired results.\\n\\nStandard protocol was applied. The eyes were covered with IPL specific eye shields. Following treatment, the expected mild erythema and edema was observed. Post care was reviewed and a follow up appointment was recommended.\\n\\Adam questions were addressed.\\n\\nRTC:4 weeks microneedling tax #1/3 for the chest and a complimentary IPL to select lesion on the left cheek

## 2020-12-10 NOTE — HPI: OTHER
Condition:: Cosmetic
Please Describe Your Condition:: Pt presents for IPL to her chest to address the browns.  Medications, allergies and medical hx were reviewed.

## 2021-01-21 ENCOUNTER — APPOINTMENT (OUTPATIENT)
Dept: URBAN - METROPOLITAN AREA CLINIC 211 | Age: 51
Setting detail: DERMATOLOGY
End: 2021-01-22

## 2021-01-21 DIAGNOSIS — Z41.9 ENCOUNTER FOR PROCEDURE FOR PURPOSES OTHER THAN REMEDYING HEALTH STATE, UNSPECIFIED: ICD-10-CM

## 2021-01-21 PROCEDURE — OTHER MIPS QUALITY: OTHER

## 2021-01-21 PROCEDURE — OTHER OTHER (COSMETIC): OTHER

## 2021-01-21 NOTE — PROCEDURE: OTHER (COSMETIC)
Detail Level: Zone
Other (Free Text): Skin Pen Rejuvenation (Microneedling)\\n\\nPE:  improvement of fine rhytids, skin texture, & scarring\\n\\nPrior to treatment, all but not limited to treatment indications and expectations(including management of any possible irritations) protocols, risks and benefits were reviewed in detail, including post treatment expectations. All questions were answered prior to administering the treatment.\\n\\nTreatment #1/3\\n\\nSite(s):chest\\n\\nChest - 2.0   mm with moderate density\\n\\nPin point bleeding was achieved as per protocol.\\n\\nNote: Written post treatment care instructions are provided.  Photo of instructions is taken in iPad.\\n\\n1.  Post treatment the LifeLine Aqueous Treatment and the LifeLine Neck Firming Complex was applied to the chest.\\Adam indications, treatment expectations (including management of any possible irritations), protocols, risks and benefits, pre/post care are reviewed.   Additional information can be found on consent documentation. Patient understands that multiple treatments may be necessary for optimum results and that ongoing maintenance with at-home products and additional office visits or treatments may be needed to enhance and extend the desired results.\\n\\nStandard protocol was done.  Following treatment, the expected mild erythema and edema was observed.  Patient tolerated the procedure well without immediate complication.  Post care was reviewed and a follow up appointment was recommended.\\n\\nAnswered all questions.\\n\\nRTC:4-6 weeks Microneedling #2/3
Other (Free Text): IPL utilizing Max G\\n\\nPE:lighter sl on the left cheek\\n\\nIndication: improvement of pigmentation and reduction of vascular lesions\\n\\nPrior to treatment, all but not limited to treatment indications and expectations(including management of any possible irritations) protocols, risks and benefits were reviewed in detail, including post treatment expectations. All questions were answered prior to administering the treatment.\\n\\nTreatment #1\\n\\nSite(s):left cheek\\n\\nSettings:\\n\\nMelanin Index=16\\n@ 20 ms value = 38  j - 44 j\\n@10 ms value = 28 j - 32 j\\n\\nApplied  20 ms @  42 j x 1 pulse\\nApplied 20 ms @ 44 j x 2 pulses\\nApplied 10 ms @ 32 j x 2 pulses\\n\\nPre, cecil and post cooling was applied utilizing the  and/or the Hayden Chiller. A moisturizing sunblock was applied post treatment. Patient tolerated the procedure well without immediate complication. \\n\\nPatient understands that multiple treatments may be necessary for optimum results and that on going maintenance with at-home products and additional office visits or treatments may be needed to enhance and extend the desired results.\\n\\nStandard protocol was applied. The eyes were covered with IPL specific eye shields. Following treatment, the expected mild erythema and edema was observed. Post care was reviewed and a follow up appointment was recommended.\\n\\Adam questions were addressed.

## 2021-01-21 NOTE — HPI: OTHER
Condition:: Cosmetic
Please Describe Your Condition:: Pt presents for microneedling #1/3 to the chest.  Pt does not want to have a chemical peel done today.  Pt is also going to have the sl on the left cheek treated today with IPL.  Medications, allergies and medical hx were reviewed.

## 2021-01-28 ENCOUNTER — APPOINTMENT (OUTPATIENT)
Dept: URBAN - METROPOLITAN AREA CLINIC 211 | Age: 51
Setting detail: DERMATOLOGY
End: 2021-01-31

## 2021-01-28 DIAGNOSIS — L98.8 OTHER SPECIFIED DISORDERS OF THE SKIN AND SUBCUTANEOUS TISSUE: ICD-10-CM

## 2021-01-28 PROCEDURE — OTHER MIPS QUALITY: OTHER

## 2021-01-28 PROCEDURE — OTHER XEOMIN: OTHER

## 2021-01-28 ASSESSMENT — LOCATION ZONE DERM: LOCATION ZONE: FACE

## 2021-01-28 ASSESSMENT — LOCATION DETAILED DESCRIPTION DERM: LOCATION DETAILED: INFERIOR MID FOREHEAD

## 2021-01-28 ASSESSMENT — LOCATION SIMPLE DESCRIPTION DERM: LOCATION SIMPLE: INFERIOR FOREHEAD

## 2021-01-28 NOTE — PROCEDURE: MIPS QUALITY
Quality 131: Pain Assessment And Follow-Up: Pain assessment using a standardized tool is documented as negative, no follow-up plan required
Detail Level: Detailed
Quality 431: Preventive Care And Screening: Unhealthy Alcohol Use - Screening: Patient screened for unhealthy alcohol use using a single question and scores less than 2 times per year
Quality 130: Documentation Of Current Medications In The Medical Record: Current Medications Documented
Quality 226: Preventive Care And Screening: Tobacco Use: Screening And Cessation Intervention: Patient screened for tobacco use and is an ex/non-smoker
Quality 110: Preventive Care And Screening: Influenza Immunization: Influenza immunization was not ordered or administered, reason not given

## 2021-01-28 NOTE — HPI: COSMETIC (XEOMIN)
Have You Had Xeomin Before?: has had xeomin
Additional History: Patient states pain level is a 0/10
When Was Your Last Xeomin Treatment?: 09/20

## 2021-03-23 ENCOUNTER — APPOINTMENT (OUTPATIENT)
Dept: URBAN - METROPOLITAN AREA CLINIC 211 | Age: 51
Setting detail: DERMATOLOGY
End: 2021-03-26

## 2021-03-23 ENCOUNTER — APPOINTMENT (OUTPATIENT)
Dept: URBAN - METROPOLITAN AREA CLINIC 211 | Age: 51
Setting detail: DERMATOLOGY
End: 2021-03-23

## 2021-03-23 DIAGNOSIS — Z41.9 ENCOUNTER FOR PROCEDURE FOR PURPOSES OTHER THAN REMEDYING HEALTH STATE, UNSPECIFIED: ICD-10-CM

## 2021-03-23 PROCEDURE — OTHER OTHER (COSMETIC): OTHER

## 2021-03-23 NOTE — HPI: OTHER
Condition:: Cosmetic
Please Describe Your Condition:: is being seen for a chief complaint of Cosmetic. Pt presents for microneedling on the chest for improvement of skin texture. Medical hx, medications and allergies were reviewed with the pt.

## 2021-03-23 NOTE — PROCEDURE: OTHER (COSMETIC)
Other (Free Text): Skin Pen Rejuvenation (Microneedling)\\n\\nPE: skin texture\\n\\nPrior to treatment, all but not limited to treatment indications and expectations(including management of any possible irritations) protocols, risks and benefits were reviewed in detail, including post treatment expectations. All questions were answered prior to administering the treatment.\\n\\nTreatment # 1/1 \\n\\nSite(s):chest \\n\\nChest : 2.0 mm moderately significant density \\n\\nPin point bleeding was achieved as per protocol.\\n\\nNote:\\n\\nAdvised pt that the next appointment will include neck as well as the chest at no additional charge.\\n\\Adam indications, treatment expectations (including management of any possible irritations), protocols, risks and benefits, pre/post care are reviewed.   Additional information can be found on consent documentation. Patient understands that multiple treatments may be necessary for optimum results and that ongoing maintenance with at-home products and additional office visits or treatments may be needed to enhance and extend the desired results.\\n\\nStandard protocol was done.  Following treatment, the expected mild erythema and edema was observed.  Patient tolerated the procedure well without immediate complication.  Post care was reviewed and a follow up appointment was recommended.\\n\\nAnswered all questions.\\n\\nRTC: 6-8 weeks for microneedling follow up with Shruti
Detail Level: Zone

## 2021-05-27 ENCOUNTER — APPOINTMENT (OUTPATIENT)
Dept: URBAN - METROPOLITAN AREA CLINIC 211 | Age: 51
Setting detail: DERMATOLOGY
End: 2021-06-01

## 2021-05-27 DIAGNOSIS — L98.8 OTHER SPECIFIED DISORDERS OF THE SKIN AND SUBCUTANEOUS TISSUE: ICD-10-CM

## 2021-05-27 PROCEDURE — OTHER MIPS QUALITY: OTHER

## 2021-05-27 PROCEDURE — OTHER COUNSELING: OTHER

## 2021-05-27 PROCEDURE — OTHER JEUVEAU: OTHER

## 2021-05-27 ASSESSMENT — LOCATION DETAILED DESCRIPTION DERM: LOCATION DETAILED: INFERIOR MID FOREHEAD

## 2021-05-27 ASSESSMENT — LOCATION SIMPLE DESCRIPTION DERM: LOCATION SIMPLE: INFERIOR FOREHEAD

## 2021-05-27 ASSESSMENT — LOCATION ZONE DERM: LOCATION ZONE: FACE

## 2021-05-27 NOTE — PROCEDURE: MIPS QUALITY
Quality 130: Documentation Of Current Medications In The Medical Record: Current Medications Documented
Detail Level: Detailed
Quality 226: Preventive Care And Screening: Tobacco Use: Screening And Cessation Intervention: Patient screened for tobacco use and is an ex/non-smoker
Quality 431: Preventive Care And Screening: Unhealthy Alcohol Use - Screening: Patient screened for unhealthy alcohol use using a single question and scores less than 2 times per year
Quality 110: Preventive Care And Screening: Influenza Immunization: Influenza immunization was not ordered or administered, reason not given
Quality 131: Pain Assessment And Follow-Up: Pain assessment using a standardized tool is documented as negative, no follow-up plan required

## 2021-09-28 NOTE — HPI: COSMETIC (XEOMIN)
Have You Had Xeomin Before?: has had xeomin
Additional History: Patient states pain level is a 0/10
Yes

## 2021-09-29 ENCOUNTER — APPOINTMENT (OUTPATIENT)
Dept: URBAN - METROPOLITAN AREA CLINIC 212 | Age: 51
Setting detail: DERMATOLOGY
End: 2021-09-29

## 2021-09-29 VITALS — TEMPERATURE: 97.4 F

## 2021-09-29 DIAGNOSIS — L98.8 OTHER SPECIFIED DISORDERS OF THE SKIN AND SUBCUTANEOUS TISSUE: ICD-10-CM

## 2021-09-29 PROCEDURE — OTHER MIPS QUALITY: OTHER

## 2021-09-29 PROCEDURE — OTHER XEOMIN: OTHER

## 2021-09-29 NOTE — HPI: COSMETIC (BOTOX)
Have You Had Botox Before?: has had botox
Additional History: Patient states pain level is a 0/10
When Was Your Last Botox Treatment?: 05/27/2021

## 2021-09-29 NOTE — PROCEDURE: MIPS QUALITY
Quality 110: Preventive Care And Screening: Influenza Immunization: Influenza immunization was not ordered or administered, reason not given
Quality 226: Preventive Care And Screening: Tobacco Use: Screening And Cessation Intervention: Patient screened for tobacco use and is an ex/non-smoker
Detail Level: Detailed
Quality 431: Preventive Care And Screening: Unhealthy Alcohol Use - Screening: Patient screened for unhealthy alcohol use using a single question and scores less than 2 times per year
Quality 130: Documentation Of Current Medications In The Medical Record: Current Medications Documented
Quality 131: Pain Assessment And Follow-Up: Pain assessment using a standardized tool is documented as negative, no follow-up plan required

## 2022-01-21 ENCOUNTER — APPOINTMENT (OUTPATIENT)
Dept: URBAN - METROPOLITAN AREA CLINIC 211 | Age: 52
Setting detail: DERMATOLOGY
End: 2022-01-21

## 2022-01-21 DIAGNOSIS — L98.8 OTHER SPECIFIED DISORDERS OF THE SKIN AND SUBCUTANEOUS TISSUE: ICD-10-CM

## 2022-01-21 DIAGNOSIS — Z41.9 ENCOUNTER FOR PROCEDURE FOR PURPOSES OTHER THAN REMEDYING HEALTH STATE, UNSPECIFIED: ICD-10-CM

## 2022-01-21 PROCEDURE — OTHER XEOMIN: OTHER

## 2022-01-21 PROCEDURE — OTHER MIPS QUALITY: OTHER

## 2022-01-21 PROCEDURE — OTHER COUNSELING: OTHER

## 2022-01-21 PROCEDURE — OTHER COSMETIC CONSULTATION: FILLERS: OTHER

## 2022-01-21 ASSESSMENT — LOCATION DETAILED DESCRIPTION DERM
LOCATION DETAILED: RIGHT INFERIOR MEDIAL FOREHEAD
LOCATION DETAILED: LEFT CENTRAL MALAR CHEEK

## 2022-01-21 ASSESSMENT — LOCATION ZONE DERM: LOCATION ZONE: FACE

## 2022-01-21 ASSESSMENT — LOCATION SIMPLE DESCRIPTION DERM
LOCATION SIMPLE: LEFT CHEEK
LOCATION SIMPLE: RIGHT FOREHEAD

## 2022-01-21 NOTE — PROCEDURE: MIPS QUALITY
Quality 110: Preventive Care And Screening: Influenza Immunization: Influenza Immunization previously received during influenza season
Quality 226: Preventive Care And Screening: Tobacco Use: Screening And Cessation Intervention: Patient screened for tobacco use and is an ex/non-smoker
Detail Level: Detailed
Quality 130: Documentation Of Current Medications In The Medical Record: Current Medications Documented
Quality 431: Preventive Care And Screening: Unhealthy Alcohol Use - Screening: Patient screened for unhealthy alcohol use using a single question and scores less than 2 times per year
Quality 431: Preventive Care And Screening: Unhealthy Alcohol Use - Screening: Patient not identified as an unhealthy alcohol user when screened for unhealthy alcohol use using a systematic screening method

## 2022-03-11 ENCOUNTER — APPOINTMENT (OUTPATIENT)
Dept: URBAN - METROPOLITAN AREA CLINIC 211 | Age: 52
Setting detail: DERMATOLOGY
End: 2022-03-14

## 2022-03-11 DIAGNOSIS — Z41.9 ENCOUNTER FOR PROCEDURE FOR PURPOSES OTHER THAN REMEDYING HEALTH STATE, UNSPECIFIED: ICD-10-CM

## 2022-03-11 PROCEDURE — OTHER OTHER (COSMETIC): OTHER

## 2022-03-11 PROCEDURE — OTHER MIPS QUALITY: OTHER

## 2022-03-11 NOTE — PROCEDURE: MIPS QUALITY
Quality 110: Preventive Care And Screening: Influenza Immunization: Influenza Immunization previously received during influenza season
Quality 226: Preventive Care And Screening: Tobacco Use: Screening And Cessation Intervention: Patient screened for tobacco use and is an ex/non-smoker
Quality 431: Preventive Care And Screening: Unhealthy Alcohol Use - Screening: Patient not identified as an unhealthy alcohol user when screened for unhealthy alcohol use using a systematic screening method
Detail Level: Detailed
Quality 130: Documentation Of Current Medications In The Medical Record: Current Medications Documented
Quality 431: Preventive Care And Screening: Unhealthy Alcohol Use - Screening: Patient screened for unhealthy alcohol use using a single question and scores less than 2 times per year

## 2022-03-11 NOTE — PROCEDURE: OTHER (COSMETIC)
Other (Free Text): Skin Pen Rejuvenation (Microneedling)\\n\\nPE: improvement of fine rhytids, skin texture, & scarring\\n\\nPrior to treatment, all but not limited to treatment indications and expectations(including management of any possible irritations) protocols, risks and benefits were reviewed in detail, including post treatment expectations. All questions were answered prior to administering the treatment.\\n\\nTreatment #3/3\\nTreatment Head Lot#:[id number]\\nTreatment Head Exp:11/11/2023\\n\\nSite(s):chest\\n\\nChest - 2.0 mm\\n\\n** Applied with more moderate density\\n\\nPin point bleeding was achieved as per protocol.\\n\\nNote: Written post treatment care instructions are provided. Photo of instructions is taken in iPad.\\n\\n1.  [employer] Discussed if pt is not ready for surgery she could consider microneedling with chemical peel.  Pt wi;; rtc in 4-6 weeks for microneedling with chemical peel for the neck and chest.  Pt states that she is pleased with the improvement in the chest with the last two treatments.\\n\\Adam indications, treatment expectations (including management of any possible irritations), protocols, risks and benefits, pre/post care are reviewed. Additional information can be found on consent documentation. Patient understands that multiple treatments may be necessary for optimum results and that ongoing maintenance with at-home products and additional office visits or treatments may be needed to enhance and extend the desired results.\\n\\nStandard protocol was done. Following treatment, the expected mild erythema and edema was observed. Patient tolerated the procedure well without immediate complication. Post care was reviewed and a follow up appointment was recommended.\\n\\nAnswered all questions.\\n\\nRTC:4-6 weeks microneedling with chemical peel on chest and neck Other (Free Text): Skin Pen Rejuvenation (Microneedling)\\n\\nPE: improvement of fine rhytids, skin texture, & scarring\\n\\nPrior to treatment, all but not limited to treatment indications and expectations(including management of any possible irritations) protocols, risks and benefits were reviewed in detail, including post treatment expectations. All questions were answered prior to administering the treatment.\\n\\nTreatment #3/3\\nTreatment Head Lot#:[id number]\\nTreatment Head Exp:11/11/2023\\n\\nSite(s):chest\\n\\nChest - 2.0 mm\\n\\n** Applied with more moderate density\\n\\nPin point bleeding was achieved as per protocol.\\n\\nNote: Written post treatment care instructions are provided. Photo of instructions is taken in iPad.\\n\\n1.  [employer]. Discussed if pt is not ready for surgery she could consider microneedling with chemical peel.  Pt wi;; rtc in 4-6 weeks for microneedling with chemical peel for the neck and chest.  Pt states that she is pleased with the improvement in the chest with the last two treatments.\\n\\dAam indications, treatment expectations (including management of any possible irritations), protocols, risks and benefits, pre/post care are reviewed. Additional information can be found on consent documentation. Patient understands that multiple treatments may be necessary for optimum results and that ongoing maintenance with at-home products and additional office visits or treatments may be needed to enhance and extend the desired results.\\n\\nStandard protocol was done. Following treatment, the expected mild erythema and edema was observed. Patient tolerated the procedure well without immediate complication. Post care was reviewed and a follow up appointment was recommended.\\n\\nAnswered all questions.\\n\\nRTC:4-6 weeks microneedling with chemical peel on chest and neck

## 2022-03-11 NOTE — HPI: OTHER
Condition:: Cosmetic
Please Describe Your Condition:: is being seen for a chief complaint of Cosmetic. Pt presents for microneedling tx3/3 to her chest.  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  She has skin laxity and is seeing some howling.  [facility] rec filler for the face but pt does not want to do fillers just yet.  Medications, allergies and medical hx were reviewed.

## 2022-06-02 ENCOUNTER — APPOINTMENT (OUTPATIENT)
Dept: URBAN - METROPOLITAN AREA CLINIC 211 | Age: 52
Setting detail: DERMATOLOGY
End: 2022-06-03

## 2022-06-02 DIAGNOSIS — L98.8 OTHER SPECIFIED DISORDERS OF THE SKIN AND SUBCUTANEOUS TISSUE: ICD-10-CM

## 2022-06-02 PROCEDURE — OTHER XEOMIN: OTHER

## 2022-06-02 PROCEDURE — OTHER COUNSELING: OTHER

## 2022-06-02 PROCEDURE — OTHER MIPS QUALITY: OTHER

## 2022-06-02 ASSESSMENT — LOCATION DETAILED DESCRIPTION DERM: LOCATION DETAILED: GLABELLA

## 2022-06-02 ASSESSMENT — LOCATION SIMPLE DESCRIPTION DERM: LOCATION SIMPLE: GLABELLA

## 2022-06-02 ASSESSMENT — LOCATION ZONE DERM: LOCATION ZONE: FACE

## 2022-06-02 NOTE — HPI: COSMETIC (XEOMIN)
Have You Had Xeomin Before?: has had xeomin
Additional History: Patient states pain level is a 0/10
When Was Your Last Xeomin Treatment?: 01/22

## 2022-06-02 NOTE — PROCEDURE: MIPS QUALITY
Quality 130: Documentation Of Current Medications In The Medical Record: Current Medications Documented
Quality 431: Preventive Care And Screening: Unhealthy Alcohol Use - Screening: Patient not identified as an unhealthy alcohol user when screened for unhealthy alcohol use using a systematic screening method
Quality 110: Preventive Care And Screening: Influenza Immunization: Influenza Immunization previously received during influenza season
Quality 431: Preventive Care And Screening: Unhealthy Alcohol Use - Screening: Patient screened for unhealthy alcohol use using a single question and scores less than 2 times per year
Quality 226: Preventive Care And Screening: Tobacco Use: Screening And Cessation Intervention: Patient screened for tobacco use and is an ex/non-smoker
Detail Level: Detailed

## 2022-06-23 ENCOUNTER — APPOINTMENT (OUTPATIENT)
Dept: URBAN - METROPOLITAN AREA CLINIC 211 | Age: 52
Setting detail: DERMATOLOGY
End: 2022-06-23

## 2022-06-23 DIAGNOSIS — L98.8 OTHER SPECIFIED DISORDERS OF THE SKIN AND SUBCUTANEOUS TISSUE: ICD-10-CM

## 2022-06-23 PROCEDURE — OTHER XEOMIN: OTHER

## 2022-06-23 PROCEDURE — OTHER MIPS QUALITY: OTHER

## 2022-06-23 PROCEDURE — OTHER OTHER: OTHER

## 2022-06-23 PROCEDURE — OTHER COUNSELING: OTHER

## 2022-06-23 NOTE — PROCEDURE: XEOMIN
Additional Area 1 Units: 3
Right Periorbital Units: 0
Show Additional Area 2: Yes
Show Right And Left Pupillary Line Units: No
Post-Care Instructions: Patient instructed to not lie down for 4 hours and limit physical activity for 24 hours. Patient instructed not to travel by airplane for 48 hours.
Additional Area 1 Location: right lateral brow
Consent: Written consent obtained. Risks include but not limited to lid/brow ptosis, bruising, swelling, diplopia, temporary effect, incomplete chemical denervation.
Detail Level: Zone
Dilution (U/0.1 Cc): 4

## 2022-06-23 NOTE — PROCEDURE: OTHER
Detail Level: Zone
Note Text (......Xxx Chief Complaint.): This diagnosis correlates with the
Render Risk Assessment In Note?: no
Other (Free Text): Patient came in after two weeks due to eyelid droop and would like “less flattening on her right brow”.  No lid ptosis is seen, but the R brow is slightly less arched than the L at rest.  It was explained to her that we could put in 3 units to right lateral brow, however she was told there is a risk that there is always a chance she will still have “unevenness” since she is asymmetrical with brow position at baseline as shown today with prior photos.

## 2022-06-23 NOTE — HPI: COSMETIC (BOTOX)
Have You Had Botox Before?: has had botox
Additional History: Patient states pain level is a 0/10
When Was Your Last Botox Treatment?: 6.2.22

## 2022-10-04 ENCOUNTER — APPOINTMENT (OUTPATIENT)
Dept: URBAN - METROPOLITAN AREA CLINIC 211 | Age: 52
Setting detail: DERMATOLOGY
End: 2022-10-04

## 2022-10-04 DIAGNOSIS — L98.8 OTHER SPECIFIED DISORDERS OF THE SKIN AND SUBCUTANEOUS TISSUE: ICD-10-CM

## 2022-10-04 PROCEDURE — OTHER XEOMIN: OTHER

## 2022-10-04 PROCEDURE — OTHER COUNSELING: OTHER

## 2022-10-04 PROCEDURE — OTHER MIPS QUALITY: OTHER

## 2022-10-04 ASSESSMENT — LOCATION DETAILED DESCRIPTION DERM: LOCATION DETAILED: RIGHT INFERIOR MEDIAL FOREHEAD

## 2022-10-04 ASSESSMENT — LOCATION ZONE DERM: LOCATION ZONE: FACE

## 2022-10-04 ASSESSMENT — LOCATION SIMPLE DESCRIPTION DERM: LOCATION SIMPLE: RIGHT FOREHEAD

## 2022-10-04 NOTE — PROCEDURE: MIPS QUALITY
Quality 110: Preventive Care And Screening: Influenza Immunization: Influenza Immunization previously received during influenza season
Quality 402: Tobacco Use And Help With Quitting Among Adolescents: Patient screened for tobacco and is an ex-smoker
Detail Level: Detailed
Quality 431: Preventive Care And Screening: Unhealthy Alcohol Use - Screening: Patient not identified as an unhealthy alcohol user when screened for unhealthy alcohol use using a systematic screening method
Quality 226: Preventive Care And Screening: Tobacco Use: Screening And Cessation Intervention: Patient screened for tobacco use and is an ex/non-smoker
Quality 130: Documentation Of Current Medications In The Medical Record: Current Medications Documented

## 2022-10-04 NOTE — HPI: COSMETIC (BOTOX)
Have You Had Botox Before?: has had botox
Additional History: Pain level 0/10
When Was Your Last Botox Treatment?: 07/2022

## 2022-10-04 NOTE — PROCEDURE: XEOMIN
Show Additional Area 1: Yes
Additional Area 6 Units: 0
Show Right And Left Pupillary Line Units: No
Detail Level: Detailed
Dilution (U/0.1 Cc): 4
Consent: Written consent obtained. Risks include but not limited to lid/brow ptosis, bruising, swelling, diplopia, temporary effect, incomplete chemical denervation.
Post-Care Instructions: Patient instructed to not lie down for 4 hours and limit physical activity for 24 hours.
Show Inventory Tab: Show
Additional Area 1 Units: 34
Additional Area 1 Location: forehead,glabella

## 2023-02-16 ENCOUNTER — APPOINTMENT (OUTPATIENT)
Dept: URBAN - METROPOLITAN AREA CLINIC 211 | Age: 53
Setting detail: DERMATOLOGY
End: 2023-02-19

## 2023-02-16 DIAGNOSIS — L98.8 OTHER SPECIFIED DISORDERS OF THE SKIN AND SUBCUTANEOUS TISSUE: ICD-10-CM

## 2023-02-16 PROCEDURE — OTHER COUNSELING: OTHER

## 2023-02-16 PROCEDURE — OTHER XEOMIN: OTHER

## 2023-02-16 PROCEDURE — OTHER MIPS QUALITY: OTHER

## 2023-02-16 NOTE — HPI: COSMETIC (XEOMIN)
Have You Had Xeomin Before?: has had xeomin
Additional History: Pain level 0/10
When Was Your Last Xeomin Treatment?: 10/22

## 2023-09-27 ENCOUNTER — APPOINTMENT (OUTPATIENT)
Dept: URBAN - METROPOLITAN AREA CLINIC 211 | Age: 53
Setting detail: DERMATOLOGY
End: 2023-09-27

## 2023-09-27 DIAGNOSIS — L98.8 OTHER SPECIFIED DISORDERS OF THE SKIN AND SUBCUTANEOUS TISSUE: ICD-10-CM

## 2023-09-27 PROCEDURE — OTHER MIPS QUALITY: OTHER

## 2023-09-27 PROCEDURE — OTHER COUNSELING: OTHER

## 2023-09-27 PROCEDURE — OTHER XEOMIN: OTHER

## 2023-09-27 NOTE — PROCEDURE: XEOMIN
Show Lcl Units: No
Show Periorbital Units: Yes
Additional Area 2 Units: 0
Periorbital Skin Units: 12
Reconstitution Date (Optional): 09/27/2023
Show Inventory Tab: Show
Glabellar Complex Units: 20
Dilution (U/0.1 Cc): 4
Post-Care Instructions: Patient instructed to not lie down for 4 hours and limit physical activity for 24 hours.
Forehead Units: 14
Detail Level: Detailed
Consent: Written consent obtained. Risks include but not limited to lid/brow ptosis, bruising, swelling, diplopia, temporary effect, incomplete chemical denervation.

## 2024-03-04 ENCOUNTER — APPOINTMENT (OUTPATIENT)
Dept: URBAN - METROPOLITAN AREA CLINIC 212 | Age: 54
Setting detail: DERMATOLOGY
End: 2024-03-04

## 2024-03-04 DIAGNOSIS — L98.8 OTHER SPECIFIED DISORDERS OF THE SKIN AND SUBCUTANEOUS TISSUE: ICD-10-CM

## 2024-03-04 PROCEDURE — OTHER XEOMIN: OTHER

## 2024-03-04 PROCEDURE — OTHER COUNSELING: OTHER

## 2024-03-04 PROCEDURE — OTHER MIPS QUALITY: OTHER

## 2024-03-04 ASSESSMENT — LOCATION SIMPLE DESCRIPTION DERM: LOCATION SIMPLE: INFERIOR FOREHEAD

## 2024-03-04 ASSESSMENT — LOCATION ZONE DERM: LOCATION ZONE: FACE

## 2024-03-04 ASSESSMENT — LOCATION DETAILED DESCRIPTION DERM: LOCATION DETAILED: INFERIOR MID FOREHEAD

## 2024-03-04 NOTE — PROCEDURE: XEOMIN
Show Orbicularis Oculi Units: No
Right Pupillary Line Units: 0
Show Anterior Platysmal Band Units: Yes
Detail Level: Detailed
Dilution (U/0.1 Cc): 4
Post-Care Instructions: Patient instructed to not lie down for 4 hours and limit physical activity for 24 hours.
Forehead Units: 14
Periorbital Skin Units: 12
Consent: Written consent obtained. Risks include but not limited to lid/brow ptosis, bruising, swelling, diplopia, temporary effect, incomplete chemical denervation.
Glabellar Complex Units: 20
Show Inventory Tab: Show

## 2024-09-04 ENCOUNTER — APPOINTMENT (OUTPATIENT)
Dept: URBAN - METROPOLITAN AREA CLINIC 211 | Age: 54
Setting detail: DERMATOLOGY
End: 2024-09-04

## 2024-09-04 DIAGNOSIS — L98.8 OTHER SPECIFIED DISORDERS OF THE SKIN AND SUBCUTANEOUS TISSUE: ICD-10-CM

## 2024-09-04 PROCEDURE — OTHER COUNSELING: OTHER

## 2024-09-04 PROCEDURE — OTHER MIPS QUALITY: OTHER

## 2024-09-04 PROCEDURE — OTHER BOTOX: OTHER

## 2024-09-04 NOTE — PROCEDURE: MIPS QUALITY
----- Message from Ilana Mcguire sent at 9/8/2023  8:18 AM CDT -----  Contact: self  Type:  Sooner Appointment Request    Caller is requesting a sooner appointment.  Caller declined first available appointment listed below.  Caller will not accept being placed on the waitlist and is requesting a message be sent to doctor.  Name of Caller: Pt  When is the first available appointment? 03/06/2024  Symptoms: red spot on nose (x 7 days), history of skin cancer  Would the patient rather a call back or a response via [facility]?  Call   Best Call Back Number: Cell  [phone] or [place] [phone]    Additional Information: Please call to advise... Thank you...         
Attempted to contact pt with no answer.  LVM to contact the clinic.   
Detail Level: Detailed
Quality 226: Preventive Care And Screening: Tobacco Use: Screening And Cessation Intervention: Patient screened for tobacco use and is an ex/non-smoker

## 2024-09-04 NOTE — HPI: COSMETIC (NEUROTOXIN)
previous_has_had_neurotoxin
When Was Your Last Neurotoxin Treatment?: 3/2024
Additional History: \\nForehead - 14 units\\nPeriorbital Skin - 12 units\\nGlabellar Complex - 20 units

## 2024-09-04 NOTE — PROCEDURE: BOTOX
Additional Area 1 Units: 0
Show Additional Area 4: No
Show Glabellar Units: Yes
Reconstitution Date (Optional): 09/03/2024
Periorbital Skin Units: 12
Price (Use Numbers Only, No Special Characters Or $): 14
Detail Level: Zone
Glabellar Complex Units: 20
Incrementing Botox Units: By 0.5 Units
Show Inventory Tab: Show
Consent: Written consent obtained. Risks include but not limited to lid/brow ptosis, bruising, swelling, diplopia, temporary effect, incomplete chemical denervation.
Dilution (U/0.1 Cc): 4
Post-Care Instructions: Patient instructed to not lie down for 4 hours and limit physical activity for 24 hours.

## 2025-02-25 ENCOUNTER — APPOINTMENT (OUTPATIENT)
Dept: URBAN - METROPOLITAN AREA CLINIC 211 | Age: 55
Setting detail: DERMATOLOGY
End: 2025-02-26

## 2025-02-25 DIAGNOSIS — L98.8 OTHER SPECIFIED DISORDERS OF THE SKIN AND SUBCUTANEOUS TISSUE: ICD-10-CM

## 2025-02-25 PROCEDURE — OTHER COUNSELING: OTHER

## 2025-02-25 PROCEDURE — OTHER BOTOX: OTHER

## 2025-02-25 PROCEDURE — OTHER MIPS QUALITY: OTHER

## 2025-02-25 NOTE — PROCEDURE: BOTOX
Additional Area 1 Units: 0
Show Additional Area 4: No
Show Glabellar Units: Yes
Periorbital Skin Units: 12
Price (Use Numbers Only, No Special Characters Or $): 14
Detail Level: Zone
Glabellar Complex Units: 20
Incrementing Botox Units: By 0.5 Units
Show Inventory Tab: Show
Consent: Written consent obtained. Risks include but not limited to lid/brow ptosis, bruising, swelling, diplopia, temporary effect, incomplete chemical denervation.
Dilution (U/0.1 Cc): 4
Post-Care Instructions: Patient instructed to not lie down for 4 hours and limit physical activity for 24 hours.

## 2025-08-26 ENCOUNTER — APPOINTMENT (OUTPATIENT)
Dept: URBAN - METROPOLITAN AREA CLINIC 211 | Age: 55
Setting detail: DERMATOLOGY
End: 2025-08-26

## 2025-08-26 DIAGNOSIS — L98.8 OTHER SPECIFIED DISORDERS OF THE SKIN AND SUBCUTANEOUS TISSUE: ICD-10-CM

## 2025-08-26 PROCEDURE — OTHER BOTOX: OTHER

## 2025-08-26 PROCEDURE — OTHER MIPS QUALITY: OTHER

## 2025-08-26 PROCEDURE — OTHER COUNSELING: OTHER
